# Patient Record
Sex: FEMALE | Race: WHITE | NOT HISPANIC OR LATINO | URBAN - METROPOLITAN AREA
[De-identification: names, ages, dates, MRNs, and addresses within clinical notes are randomized per-mention and may not be internally consistent; named-entity substitution may affect disease eponyms.]

---

## 2018-09-17 PROBLEM — 428283002 HISTORY OF POLYP OF COLON (SITUATION): Status: ACTIVE | Noted: 2018-09-17

## 2018-09-17 PROBLEM — 266433003 GASTRO-ESOPHAGEAL REFLUX DISEASE WITH ESOPHAGITIS: Status: ACTIVE | Noted: 2017-03-22

## 2018-09-17 PROBLEM — 92411005 BENIGN NEOPLASM OF STOMACH: Status: ACTIVE | Noted: 2017-03-22

## 2021-04-01 ENCOUNTER — OFFICE VISIT (OUTPATIENT)
Dept: URBAN - METROPOLITAN AREA CLINIC 33 | Facility: CLINIC | Age: 66
End: 2021-04-01

## 2021-04-01 ENCOUNTER — DASHBOARD ENCOUNTERS (OUTPATIENT)
Age: 66
End: 2021-04-01

## 2021-04-01 VITALS
OXYGEN SATURATION: 98 % | WEIGHT: 153 LBS | HEART RATE: 76 BPM | BODY MASS INDEX: 24.59 KG/M2 | DIASTOLIC BLOOD PRESSURE: 78 MMHG | SYSTOLIC BLOOD PRESSURE: 126 MMHG | HEIGHT: 66 IN | TEMPERATURE: 97.6 F

## 2021-04-01 PROBLEM — 422587007: Status: ACTIVE | Noted: 2021-04-01

## 2021-04-01 PROBLEM — 442076002: Status: ACTIVE | Noted: 2021-04-01

## 2021-04-01 PROBLEM — 40739000 DYSPHAGIA: Status: ACTIVE | Noted: 2017-03-22

## 2021-04-01 PROBLEM — 266435005 GASTRO-ESOPHAGEAL REFLUX DISEASE WITHOUT ESOPHAGITIS: Status: ACTIVE | Noted: 2021-04-01

## 2021-04-01 RX ORDER — BUPROPION HYDROCHLORIDE 150 MG/1
1 TABLET IN THE MORNING TABLET, FILM COATED, EXTENDED RELEASE ORAL ONCE A DAY
Qty: 30 | Status: ACTIVE | COMMUNITY

## 2021-04-01 RX ORDER — IBANDRONATE SODIUM 3 MG/3ML
3 ML INJECTION, SOLUTION INTRAVENOUS
Status: ON HOLD | COMMUNITY

## 2021-04-01 RX ORDER — VENLAFAXINE HYDROCHLORIDE 37.5 MG/1
1 CAPSULE WITH FOOD CAPSULE, EXTENDED RELEASE ORAL ONCE A DAY
Status: ACTIVE | COMMUNITY

## 2021-04-01 RX ORDER — SODIUM PICOSULFATE, MAGNESIUM OXIDE, AND ANHYDROUS CITRIC ACID 10; 3.5; 12 MG/160ML; G/160ML; G/160ML
AS DIRECTED LIQUID ORAL
Qty: 1 KIT | Refills: 0 | Status: ON HOLD | COMMUNITY

## 2021-04-01 RX ORDER — OMEPRAZOLE 20 MG/1
1 CAPSULE CAPSULE, DELAYED RELEASE ORAL
Status: ACTIVE | COMMUNITY

## 2021-04-01 RX ORDER — ATORVASTATIN CALCIUM 10 MG/1
1 TABLET TABLET, FILM COATED ORAL ONCE A DAY
Qty: 30 | Status: ACTIVE | COMMUNITY

## 2021-04-01 RX ORDER — ERGOCALCIFEROL 1.25 MG/1
1 CAPSULE CAPSULE ORAL
COMMUNITY

## 2021-04-01 RX ORDER — BISACODYL 5 MG
1 TABLET AS NEEDED TABLET, DELAYED RELEASE (ENTERIC COATED) ORAL ONCE A DAY
Qty: 30 | Status: ACTIVE | COMMUNITY
Start: 2021-04-01

## 2021-04-01 RX ORDER — ACETAMINOPHEN,PHENIRAMINE MALEATE, PHENYLEPHRINE HYDROCHLORIDE 650; 20; 10 MG/15000MG; MG/15000MG; MG/15000MG
AS DIRECTED POWDER, FOR SUSPENSION ORAL
Status: ON HOLD | COMMUNITY
Start: 2018-09-17

## 2021-04-01 RX ORDER — FAMOTIDINE 40 MG/1
1 TABLET AT BEDTIME TABLET, FILM COATED ORAL ONCE A DAY
Qty: 90 TABLET | Refills: 0 | OUTPATIENT
Start: 2021-04-01

## 2021-04-01 RX ORDER — FOLIC ACID/MULTIVIT,IRON,MINER .4-18-35
TABLET,CHEWABLE ORAL ONCE A DAY
Status: ACTIVE | COMMUNITY

## 2021-04-01 RX ORDER — ERGOCALCIFEROL 1.25 MG/1
1 CAPSULE CAPSULE, LIQUID FILLED ORAL
Qty: 4 | Status: ACTIVE | COMMUNITY

## 2021-04-01 NOTE — HPI-MIGRATED HPI
;   ;   ;     Dysphagia : Patient present for follow up for Dysphagia.  Patient admits that she has made some dietary modifications and it has helped reduce the symptoms of dysphagia.     Last Office Visit (9/17/2018) She currently admits episodes of dysphagia occurring about once per week. Symptoms occur only with solid foods, which will feel lodged retrosternally. During episodes, she will hiccup for a few minutes until the food goes down.  Admits associated odynophagia during epsidodes. She notes that an aggravating factor is alcohol, so she avoids drinking.   Patient was told the esophageal manometry was going to several thousands of dollars, and does not want to proceed.  She states bread and meats will certainly give her issues still, but she doesn't feel her issues are severe enough to do tihs type of investigation.  At last visit, patient admits progressive worsening of her dysphagia over the past year. Following her most recent office visit in late 2015, symptoms were resolved so she did not request a refill for Pantoprazole. Since 2016, she has been taking Omeprazole 20 mg prn (a few days per week) with some control of symptoms.  Patient currently admits episodes of dysphagia occurring about once per week. Symptoms occur only with solid foods, which will feel lodged retrosternally. During episodes, she will hiccup for a few minutes until the food goes down. Admits associated odynophagia during epsidodes. She notes that an aggravating factor is alcohol, so she avoids drinking.  Her last episode of dysphagia was about 2-3 weeks ago. She states that this episode was very severe and painful, lasting at least 15 minutes. She was eating steak, spinach souffle, and a baked potato. Denies any issues since this episode, as she has been eating soft foods due to her oral surgery earlier this month.;   Surveillance Colonoscopy :    Last Office Visit: (12/13/2018) Patient is a 62-year-old  female who presents today for consultation for a colonoscopy.  She has a personal history of colonic polyps and her last colonoscopy was in 2015. Paternal grandmother and greatmother with history of colon/rectal cancer. Patient currently admits 4-5 bowel movements per week.  Stools are formed and normal.     COLONOSCOPY 07/29/2015 Dr. Amaya  COLONOSCOPY- 07/29/2015: Colonoscopy revealed mild diverticulosis within the sigmoid and descending colon, a 12mm polyp (Elayne IIa) within the proximal ascending colon, and internal hemorrhoids.   Lab:Pathology - Colonoscopy 07/29/2015: PROXIMAL ASCENDING COLON POLYP - Hyperplastic polyp.;   Constipation : Patient admits that after her peridontal surgery she began to experience constipation. After surgery she hadn't had a BM in 7 days she had began taking a laxative and wasn't unable to go.   She admits that she took a hot bath and was then able to have a BM. She reports that 2 weeks later the same thing happend in which she wasn't able to have a BM for 7 days she then began taking laxatives depending on her BMs.    Currently reports 1 BM every other day with the use of laxatives her stools are semi formed without blood, mucus, or melena.   Patient admits nausea episodes 1-2 times a week.    ;

## 2021-04-06 LAB
ALBUMIN/GLOBULIN RATIO: 2
ALBUMIN: 4.3
ALKALINE PHOSPHATASE: 62
ALT: 16
AST: 18
BILIRUBIN, TOTAL: 0.4
BUN/CREATININE RATIO: (no result)
CALCIUM: 9.4
CARBON DIOXIDE: 27
CHLORIDE: 100
CORTISOL, TOTAL: 7.9
CREATININE: 0.82
EGFR AFRICAN AMERICAN: 86
EGFR NON-AFR. AMERICAN: 75
GLOBULIN: 2.1
GLUCOSE: 135
LIPASE: 37
POTASSIUM: 4.9
PROTEIN, TOTAL: 6.4
SODIUM: 137
TSH: 1.32
UREA NITROGEN (BUN): 10

## 2021-04-27 ENCOUNTER — TELEPHONE ENCOUNTER (OUTPATIENT)
Dept: URBAN - METROPOLITAN AREA CLINIC 35 | Facility: CLINIC | Age: 66
End: 2021-04-27

## 2021-05-07 ENCOUNTER — OFFICE VISIT (OUTPATIENT)
Dept: URBAN - METROPOLITAN AREA SURGERY CENTER 8 | Facility: SURGERY CENTER | Age: 66
End: 2021-05-07

## 2021-05-20 ENCOUNTER — OFFICE VISIT (OUTPATIENT)
Dept: URBAN - METROPOLITAN AREA CLINIC 33 | Facility: CLINIC | Age: 66
End: 2021-05-20

## 2023-04-26 ENCOUNTER — TELEPHONE ENCOUNTER (OUTPATIENT)
Dept: URBAN - METROPOLITAN AREA CLINIC 63 | Facility: CLINIC | Age: 68
End: 2023-04-26